# Patient Record
Sex: MALE | Race: OTHER | HISPANIC OR LATINO | URBAN - METROPOLITAN AREA
[De-identification: names, ages, dates, MRNs, and addresses within clinical notes are randomized per-mention and may not be internally consistent; named-entity substitution may affect disease eponyms.]

---

## 2023-01-17 ENCOUNTER — EMERGENCY (EMERGENCY)
Facility: HOSPITAL | Age: 58
LOS: 1 days | Discharge: HOME | End: 2023-01-17
Attending: EMERGENCY MEDICINE | Admitting: EMERGENCY MEDICINE
Payer: MEDICAID

## 2023-01-17 VITALS
SYSTOLIC BLOOD PRESSURE: 95 MMHG | OXYGEN SATURATION: 96 % | DIASTOLIC BLOOD PRESSURE: 53 MMHG | WEIGHT: 179.9 LBS | HEART RATE: 110 BPM | TEMPERATURE: 99 F | RESPIRATION RATE: 14 BRPM | HEIGHT: 69 IN

## 2023-01-17 PROCEDURE — 99284 EMERGENCY DEPT VISIT MOD MDM: CPT

## 2023-01-17 NOTE — ED PROVIDER NOTE - OBJECTIVE STATEMENT
Patient is a 57-year-old male with a history of bipolar (on Seroquel) and heroin abuse who presents with heroin overdose.  Patient endorses taking 2 bags of heroin today, was found unconscious by [facility] staff and was given 8 mg of Narcan intranasally.  At this time, patient is awake attractive, calmly, occasionally moving around, no respiratory distress. No complaints, denies any other drug use or injury

## 2023-01-17 NOTE — ED PROVIDER NOTE - PATIENT PORTAL LINK FT
You can access the FollowMyHealth Patient Portal offered by [facility] by registering at the following website: [email or web address]. By joining [facility]’s FollowMyHealth portal, you will also be able to view your health information using other applications (apps) compatible with our system.

## 2023-01-17 NOTE — ED PROVIDER NOTE - PHYSICAL EXAMINATION
Cristiane Khoury MD  GENERAL: Patient awake alert, +mildly agitated   HEENT: NC/AT, Moist mucous membranes, EOMI.  LUNGS: CTAB, no wheezes or crackles.   CARDIAC: RRR, no m/r/g.    ABDOMEN: Soft, NT, ND  EXT: No edema.  MSK: No pain with movement, no deformities.  NEURO: A&Ox2-3. Moving all extremities.  SKIN: Warm and dry. No rash.  PSYCH: mildly agitated, answers questions mostly appropriately

## 2023-01-17 NOTE — ED PROVIDER NOTE - PROGRESS NOTE DETAILS
Pt is mildly tachycardic, likely secondary to withdrawal. Pt feels well and has no complaints at this time. was able to tolerate po. Patient was re-evaluated and doing well. Results, including any incidental findings, were discussed. Return precautions and follow up were discussed. Patient verbalized understanding.

## 2023-01-17 NOTE — ED ADULT NURSE NOTE - NSIMPLEMENTINTERV_GEN_ALL_ED
Implemented All Fall Risk Interventions:  [place] to call system. Call bell, personal items and telephone within reach. Instruct patient to call for assistance. Room bathroom lighting operational. Non-slip footwear when patient is off stretcher. Physically safe environment: no spills, clutter or unnecessary equipment. Stretcher in lowest position, wheels locked, appropriate side rails in place. Provide visual cue, wrist band, yellow gown, etc. Monitor gait and stability. Monitor for mental status changes and reorient to person, place, and time. Review medications for side effects contributing to fall risk. Reinforce activity limits and safety measures with patient and family.

## 2023-01-17 NOTE — ED PROVIDER NOTE - CLINICAL SUMMARY MEDICAL DECISION MAKING FREE TEXT BOX
[place] - Patient is a 57-year-old male with a history of bipolar (on Seroquel) and heroin abuse who presents with heroin overdose. At this time not in resp distress, will continue to observe.
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
## 2023-01-17 NOTE — ED PROVIDER NOTE - NSFOLLOWUPINSTRUCTIONS_ED_ALL_ED_FT
Opioid Overdose    Opioids are drugs that are often used to treat pain. Opioids include illegal drugs, such as heroin, as well as prescription pain medicines, such as codeine, morphine, hydrocodone, and fentanyl.    An opioid overdose happens when you take too much of an opioid. An overdose may be intentional or accidental and can happen with any type of opioid.    The effects of an overdose can be mild, dangerous, or even deadly. Opioid overdose is a medical emergency.  What are the causes?    This condition may be caused by:  •Taking too much of an opioid on purpose.  •Taking too much of an opioid by accident.  •Using two or more substances that contain opioids at the same time.    •Taking an opioid with a substance that affects your heart, breathing, or blood pressure. These include alcohol, tranquilizers, sleeping pills, illegal drugs, and some over-the-counter medicines.    This condition may also happen due to an error made by:  •A health care provider who prescribes a medicine.  •The pharmacist who fills the prescription.  What increases the risk?    This condition is more likely in:  •Children. They may be attracted to colorful pills. Because of a child's small size, even a small amount of a medicine can be dangerous.  •Older people. They may be taking many different medicines. Older people may have difficulty reading labels or remembering when they last took their medicines. They may also be more sensitive to the effects of opioids.  •People with chronic medical conditions, especially heart, liver, kidney, or neurological diseases.  •People who take an opioid for a long period of time.  •People who take opioids and use illegal drugs, such as heroin, or other substances, such as alcohol.    •People who:  •Have a history of drug or alcohol abuse.  •Have certain mental health conditions.  •Have a history of previous drug overdoses.  •People who take opioids that are not prescribed for them.  What are the signs or symptoms?    Symptoms of this condition depend on the type of opioid and the amount that was taken. Common symptoms include:  •Sleepiness or difficulty waking from sleep.  •Confusion.  •Slurred speech.  •Slowed breathing and a slow pulse (bradycardia).  •Nausea and vomiting.  •Abnormally small pupils.  Signs and symptoms that require emergency treatment include:  •Cold, clammy, and pale skin.  •Blue lips and fingernails.  •Vomiting.  •Gurgling sounds in the throat.  •A pulse that is very slow or difficult to detect.  •Breathing that is very irregular, slow, noisy, or difficult to detect.  •Inability to respond to speech or be awakened from sleep (stupor).  •Seizures.  How is this diagnosed?    This condition is diagnosed based on your symptoms and medical history. It is important to tell your health care provider:  •About all of the opioids that you took.  •When you took the opioids.  •Whether you were drinking alcohol or using marijuana, cocaine, or other drugs.    Your health care provider will do a physical exam. This exam may include:  •Checking and monitoring your heart rate and rhythm, breathing rate, temperature, and blood pressure.  •Measuring oxygen levels in your blood.  •Checking for abnormally small pupils.    You may also have blood tests or urine tests. You may have X-rays if you are having severe breathing problems.    How is this treated?    This condition requires immediate medical treatment and hospitalization. Reversing the effects of the opioid is the first step in treatment. If you have a Narcan kit or naloxone, use it right away. Follow your health care provider's instructions. A friend or family member can also help you with this.    The rest of your treatment will be given in the hospital intensive care (ICU). Treatment in the hospital may include:  •Giving salts and minerals (electrolytes) along with fluids through an IV.  •Inserting a breathing tube (endotracheal tube) in your airway to help you breathe if you cannot breathe on your own or you are in danger of not being able to breathe on your own.  •Giving oxygen through a small tube under your nose.  •Passing a tube through your nose and into your stomach (nasogastric tube, or NG tube) to empty your stomach.  •Giving medicines that:  •Increase your blood pressure.  •Relieve nausea and vomiting.  •Relieve abdominal pain and cramping.  •Reverse the effects of the opioid (naloxone).  •Monitoring your heart and oxygen levels.  •Ongoing counseling and mental health support if you intentionally overdosed or used an illegal drug.    Follow these instructions at home:  Three cups showing dark yellow, yellow, and pale yellow urine.   Medicines     •Take over-the-counter and prescription medicines only as told by your health care provider.    •Always ask your health care provider about possible side effects and interactions of any new medicine that you start taking.    •Keep a list of all the medicines that you take, including over-the-counter medicines. Bring this list with you to all your medical visits.  General instructions   •Drink enough fluid to keep your urine pale yellow.  •Keep all follow-up visits. This is important.    How is this prevented?  •Read the drug inserts that come with your opioid pain medicines.  •Take medicines only as told by your health care provider. Do not take more medicine than you are told. Do not take medicines more frequently than you are told.  • Do not drink alcohol or take sedatives when taking opioids.  • Do not use illegal or recreational drugs, including cocaine, ecstasy, and marijuana.  • Do not take opioid medicines that are not prescribed for you.  •Store all medicines in safety containers that are out of the reach of children.    •Get help if you are struggling with:  •Alcohol or drug use.  •Depression or another mental health problem.  •Thoughts of hurting yourself or another person.  •Keep the phone number of your local poison control center near your phone or in your mobile phone. In the [place] the hotline of the [place] [facility] is (327) 451-2174.  •If you were prescribed naloxone, make sure you understand how to take it.    Contact a health care provider if:    •You need help understanding how to take your pain medicines.  •You feel your medicines are too strong.  •You are concerned that your pain medicines are not working well for your pain.  •You develop new symptoms or side effects when you are taking medicines.      Get help right away if:    •You or someone else is having symptoms of an opioid overdose. Get help even if you are not sure.  •You have thoughts about hurting yourself or others.    •You have:  •Chest pain.  •Difficulty breathing.  •A loss of consciousness.    These symptoms may represent a serious problem that is an emergency. Do not wait to see if the symptoms will go away. Get medical help right away. Call your local emergency services (261 in the [place] Do not drive yourself to the hospital.     If you ever feel like you may hurt yourself or others, or have thoughts about taking your own life, get help right away. You can go to your nearest emergency department or:   • Call your local emergency services (422 in the [place]    •  Call a suicide crisis helpline, such as the National Suicide Prevention Lifeline at 1-943.642.9351 or 155 in the [place] This is open 24 hours a day in the [place]    • Text the Crisis Text Line at 508466 (in the [place]     Summary    •Opioids are drugs that are often used to treat pain. Opioids include illegal drugs, such as heroin, as well as prescription pain medicines.  •An opioid overdose happens when you take too much of an opioid.  •Overdoses can be intentional or accidental.  •Opioid overdose is very dangerous. It is a life-threatening emergency.  •If you or someone you know is experiencing an opioid overdose, get help right away.
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
## 2023-01-17 NOTE — ED PROVIDER NOTE - ATTENDING CONTRIBUTION TO CARE
Patient presents status post requiring nasal Narcan.  Patient arrives alert and oriented, breathing spontaneously, in no respiratory distress will observe for any signs of rebound overdose after Narcan wears off.  We will otherwise treat supportively.

## 2023-01-18 VITALS
OXYGEN SATURATION: 94 % | SYSTOLIC BLOOD PRESSURE: 110 MMHG | HEART RATE: 114 BPM | DIASTOLIC BLOOD PRESSURE: 62 MMHG | RESPIRATION RATE: 18 BRPM
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
## 2023-01-19 DIAGNOSIS — R00.0 TACHYCARDIA, UNSPECIFIED: ICD-10-CM

## 2023-01-19 DIAGNOSIS — F31.9 BIPOLAR DISORDER, UNSPECIFIED: ICD-10-CM
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
## 2023-01-19 DIAGNOSIS — T40.1X1A POISONING BY HEROIN, ACCIDENTAL (UNINTENTIONAL), INITIAL ENCOUNTER: ICD-10-CM

## 2023-01-19 DIAGNOSIS — R45.1 RESTLESSNESS AND AGITATION: ICD-10-CM

## 2023-07-31 ENCOUNTER — EMERGENCY (EMERGENCY)
Facility: HOSPITAL | Age: 58
LOS: 1 days | Discharge: HOME | End: 2023-07-31
Admitting: EMERGENCY MEDICINE
Payer: MEDICAID

## 2023-07-31 VITALS
RESPIRATION RATE: 16 BRPM | WEIGHT: 164.91 LBS | OXYGEN SATURATION: 95 % | SYSTOLIC BLOOD PRESSURE: 138 MMHG | HEART RATE: 89 BPM | TEMPERATURE: 98 F | DIASTOLIC BLOOD PRESSURE: 80 MMHG

## 2023-07-31 PROCEDURE — 70486 CT MAXILLOFACIAL W/O DYE: CPT | Mod: 26

## 2023-07-31 PROCEDURE — 70450 CT HEAD/BRAIN W/O DYE: CPT | Mod: 26

## 2023-07-31 PROCEDURE — 99284 EMERGENCY DEPT VISIT MOD MDM: CPT | Mod: 25

## 2023-07-31 PROCEDURE — 12011 RPR F/E/E/N/L/M 2.5 CM/<: CPT

## 2023-07-31 RX ORDER — TETANUS TOXOID, REDUCED DIPHTHERIA TOXOID AND ACELLULAR PERTUSSIS VACCINE, ADSORBED 5; 2.5; 8; 8; 2.5 [IU]/.5ML; [IU]/.5ML; UG/.5ML; UG/.5ML; UG/.5ML
0.5 SUSPENSION INTRAMUSCULAR ONCE
Refills: 0 | Status: COMPLETED | OUTPATIENT
Start: 2023-07-31 | End: 2023-07-31

## 2023-07-31 RX ADMIN — TETANUS TOXOID, REDUCED DIPHTHERIA TOXOID AND ACELLULAR PERTUSSIS VACCINE, ADSORBED 0.5 MILLILITER(S): 5; 2.5; 8; 8; 2.5 SUSPENSION INTRAMUSCULAR at 19:19

## 2023-07-31 NOTE — ED ADULT NURSE NOTE - NSFALLUNIVINTERV_ED_ALL_ED
Bed/Stretcher in lowest position, wheels locked, appropriate side rails in place/Call bell, personal items and telephone in reach/Instruct patient to call for assistance before getting out of bed/chair/stretcher/Non-slip footwear applied when patient is off stretcher/[place] to call system/Physically safe environment - no spills, clutter or unnecessary equipment/Purposeful proactive rounding/Room/bathroom lighting operational, light cord in reach

## 2023-07-31 NOTE — ED PROVIDER NOTE - PATIENT PORTAL LINK FT
You can access the FollowMyHealth Patient Portal offered by [facility] by registering at the following website: [email or web address]. By joining [facility]’s FollowMyHealth portal, you will also be able to view your health information using other applications (apps) compatible with our system.
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
## 2023-07-31 NOTE — ED PROVIDER NOTE - NSFOLLOWUPINSTRUCTIONS_ED_ALL_ED_FT
Eat soft foods for the next 2 days.  Return immediately if fever, chills, increased pain or swelling or discharge. Eat soft foods for the next 2 days.  Return immediately if fever, chills, increased pain or swelling or discharge. Use listerine mouth wash to keep wound clean

## 2023-07-31 NOTE — ED ADULT NURSE NOTE - NS ED PATIENT SAFETY CONCERN
No Bilateral UE muscle strength grossly 3/5 Throughout; Bilateral LE muscle strength grossly 3/5 Throughout.
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
## 2023-08-01 PROBLEM — F31.9 BIPOLAR DISORDER, UNSPECIFIED: Chronic | Status: ACTIVE | Noted: 2023-01-17

## 2023-08-03 DIAGNOSIS — F31.9 BIPOLAR DISORDER, UNSPECIFIED: ICD-10-CM

## 2023-08-03 DIAGNOSIS — S01.511A LACERATION WITHOUT FOREIGN BODY OF LIP, INITIAL ENCOUNTER: ICD-10-CM
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
## 2023-08-03 DIAGNOSIS — Z23 ENCOUNTER FOR IMMUNIZATION: ICD-10-CM
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
## 2024-01-01 NOTE — ED ADULT NURSE NOTE - NS ED NURSE RECORD ANOTHER HT AND WT
Problem: Pain  Goal: # Acceptable pain/comfort level is achieved/maintained at rest using age and developmentally appropriate pediatric pain scale (NRS, FACES, FLACC, NPASS)  Outcome: Monitoring/Evaluating progress     Problem: Oxygenation/Respiratory Function  Goal: # Maintain/achieve baseline respiratory status (ex: appropriate respiratory rate, nonlabored work of breathing, symmetrical chest expansion, optimal breath sounds, effective gas exchange)  Outcome: Monitoring/Evaluating progress  Goal: # Maintain/achieve clear and patent airway  Outcome: Monitoring/Evaluating progress  Goal: # Optimized respiratory function  Outcome: Monitoring/Evaluating progress     Problem: Skin Integrity  Goal: # Skin integrity is maintained or improved (skin will be intact without erythema or breakdown)  Outcome: Monitoring/Evaluating progress     Problem: Thermoregulation  Goal: # Body temperature maintained within normal range  Outcome: Monitoring/Evaluating progress     Problem: Nutrition  Goal: Tolerates feedings  Outcome: Monitoring/Evaluating progress  Goal: Consumes sufficient dietary intake without complications  Outcome: Monitoring/Evaluating progress     Problem: At Risk for Falls  Goal: Patient does not fall  Outcome: Monitoring/Evaluating progress  Goal: # Actions taken to control fall-related risks  Outcome: Monitoring/Evaluating progress  Goal: # Patient/family/caregiver verbalize understanding of fall risk/precautions  Description: Document education using the patient education activity  Outcome: Monitoring/Evaluating progress     Problem: Cardiac Surgery  Goal: Hemodynamic stability achieved/maintained  Description: Hemodynamic instability may include VS or rhythm changes or s/s FVE.  AHA guidelines: Keep BP>90 mm Hg.  Outcome: Monitoring/Evaluating progress      Yes